# Patient Record
Sex: FEMALE | Race: WHITE | Employment: FULL TIME | URBAN - METROPOLITAN AREA
[De-identification: names, ages, dates, MRNs, and addresses within clinical notes are randomized per-mention and may not be internally consistent; named-entity substitution may affect disease eponyms.]

---

## 2017-01-18 PROBLEM — O34.219 PREVIOUS CESAREAN DELIVERY, ANTEPARTUM CONDITION OR COMPLICATION: Status: ACTIVE | Noted: 2017-01-18

## 2017-02-13 PROBLEM — O24.414 INSULIN CONTROLLED GESTATIONAL DIABETES MELLITUS IN THIRD TRIMESTER: Status: ACTIVE | Noted: 2017-02-13

## 2017-02-24 PROBLEM — O24.419 GESTATIONAL DIABETES MELLITUS (GDM), ANTEPARTUM: Status: ACTIVE | Noted: 2017-02-13

## 2017-03-06 PROBLEM — O99.213 MATERNAL MORBID OBESITY IN THIRD TRIMESTER, ANTEPARTUM (HCC): Status: ACTIVE | Noted: 2017-03-06

## 2017-03-06 PROBLEM — E66.01 MATERNAL MORBID OBESITY IN THIRD TRIMESTER, ANTEPARTUM (HCC): Status: ACTIVE | Noted: 2017-03-06

## 2017-03-06 PROBLEM — O24.419 GESTATIONAL DIABETES MELLITUS (GDM), ANTEPARTUM: Status: RESOLVED | Noted: 2017-02-13 | Resolved: 2017-03-06

## 2017-03-06 PROBLEM — O24.414 INSULIN CONTROLLED GESTATIONAL DIABETES MELLITUS (GDM) DURING PREGNANCY, ANTEPARTUM: Status: ACTIVE | Noted: 2017-03-06

## 2017-03-24 PROBLEM — G43.009 MIGRAINE WITHOUT AURA AND WITHOUT STATUS MIGRAINOSUS, NOT INTRACTABLE: Status: ACTIVE | Noted: 2017-03-24

## 2017-03-24 PROBLEM — O26.893 PREGNANCY HEADACHE IN THIRD TRIMESTER: Status: ACTIVE | Noted: 2017-03-24

## 2017-03-24 PROBLEM — R51.9 PREGNANCY HEADACHE IN THIRD TRIMESTER: Status: ACTIVE | Noted: 2017-03-24

## 2017-03-24 PROBLEM — R42 DIZZINESS: Status: ACTIVE | Noted: 2017-03-24

## 2017-03-28 PROBLEM — R07.89 ATYPICAL CHEST PAIN: Status: ACTIVE | Noted: 2017-03-28

## 2019-04-09 ENCOUNTER — HOSPITAL ENCOUNTER (OUTPATIENT)
Dept: NON INVASIVE DIAGNOSTICS | Age: 34
Discharge: HOME | End: 2019-04-09
Payer: COMMERCIAL

## 2019-04-09 ENCOUNTER — HOSPITAL ENCOUNTER (OUTPATIENT)
Age: 34
Discharge: HOME | End: 2019-04-09
Payer: COMMERCIAL

## 2019-04-09 LAB
ALBUMIN SERPL-MCNC: 4.1 G/DL (ref 3.5–5.2)
ALP BLD-CCNC: 98 U/L (ref 35–104)
ALT SERPL-CCNC: 18 U/L (ref 0–32)
ANION GAP SERPL CALCULATED.3IONS-SCNC: 10 MMOL/L (ref 7–16)
AST SERPL-CCNC: 16 U/L (ref 0–31)
BASOPHILS ABSOLUTE: 0.04 E9/L (ref 0–0.2)
BASOPHILS RELATIVE PERCENT: 0.7 % (ref 0–2)
BILIRUB SERPL-MCNC: 0.4 MG/DL (ref 0–1.2)
BUN BLDV-MCNC: 10 MG/DL (ref 6–20)
CALCIUM SERPL-MCNC: 9.4 MG/DL (ref 8.6–10.2)
CHLORIDE BLD-SCNC: 107 MMOL/L (ref 98–107)
CHOLESTEROL, FASTING: 141 MG/DL (ref 0–199)
CO2: 25 MMOL/L (ref 22–29)
CREAT SERPL-MCNC: 0.8 MG/DL (ref 0.5–1)
EOSINOPHILS ABSOLUTE: 0.27 E9/L (ref 0.05–0.5)
EOSINOPHILS RELATIVE PERCENT: 4.6 % (ref 0–6)
GFR AFRICAN AMERICAN: >60
GFR NON-AFRICAN AMERICAN: >60 ML/MIN/1.73
GLUCOSE BLD-MCNC: 110 MG/DL (ref 74–99)
HCT VFR BLD CALC: 38.2 % (ref 34–48)
HDLC SERPL-MCNC: 62 MG/DL
HEMOGLOBIN: 11.6 G/DL (ref 11.5–15.5)
IMMATURE GRANULOCYTES #: 0.03 E9/L
IMMATURE GRANULOCYTES %: 0.5 % (ref 0–5)
LDL CHOLESTEROL CALCULATED: 64 MG/DL (ref 0–99)
LV EF: 64 %
LVEF MODALITY: NORMAL
LYMPHOCYTES ABSOLUTE: 1.53 E9/L (ref 1.5–4)
LYMPHOCYTES RELATIVE PERCENT: 26.3 % (ref 20–42)
MCH RBC QN AUTO: 23.3 PG (ref 26–35)
MCHC RBC AUTO-ENTMCNC: 30.4 % (ref 32–34.5)
MCV RBC AUTO: 76.9 FL (ref 80–99.9)
MONOCYTES ABSOLUTE: 0.47 E9/L (ref 0.1–0.95)
MONOCYTES RELATIVE PERCENT: 8.1 % (ref 2–12)
NEUTROPHILS ABSOLUTE: 3.48 E9/L (ref 1.8–7.3)
NEUTROPHILS RELATIVE PERCENT: 59.8 % (ref 43–80)
PDW BLD-RTO: 15.4 FL (ref 11.5–15)
PLATELET # BLD: 256 E9/L (ref 130–450)
PMV BLD AUTO: 11.6 FL (ref 7–12)
POTASSIUM SERPL-SCNC: 4.2 MMOL/L (ref 3.5–5)
RBC # BLD: 4.97 E12/L (ref 3.5–5.5)
SODIUM BLD-SCNC: 142 MMOL/L (ref 132–146)
TOTAL PROTEIN: 7.2 G/DL (ref 6.4–8.3)
TRIGLYCERIDE, FASTING: 73 MG/DL (ref 0–149)
TSH SERPL DL<=0.05 MIU/L-ACNC: 2.72 UIU/ML (ref 0.27–4.2)
VITAMIN D 25-HYDROXY: 14 NG/ML (ref 30–100)
VLDLC SERPL CALC-MCNC: 15 MG/DL
WBC # BLD: 5.8 E9/L (ref 4.5–11.5)

## 2019-04-09 PROCEDURE — 36415 COLL VENOUS BLD VENIPUNCTURE: CPT

## 2019-04-09 PROCEDURE — 85025 COMPLETE CBC W/AUTO DIFF WBC: CPT

## 2019-04-09 PROCEDURE — 80061 LIPID PANEL: CPT

## 2019-04-09 PROCEDURE — 84443 ASSAY THYROID STIM HORMONE: CPT

## 2019-04-09 PROCEDURE — 93005 ELECTROCARDIOGRAM TRACING: CPT | Performed by: FAMILY MEDICINE

## 2019-04-09 PROCEDURE — 80053 COMPREHEN METABOLIC PANEL: CPT

## 2019-04-09 PROCEDURE — 93010 ELECTROCARDIOGRAM REPORT: CPT | Performed by: INTERNAL MEDICINE

## 2019-04-09 PROCEDURE — 82306 VITAMIN D 25 HYDROXY: CPT

## 2019-04-09 PROCEDURE — 93306 TTE W/DOPPLER COMPLETE: CPT

## 2019-04-11 LAB
EKG ATRIAL RATE: 73 BPM
EKG P AXIS: 23 DEGREES
EKG P-R INTERVAL: 144 MS
EKG Q-T INTERVAL: 392 MS
EKG QRS DURATION: 88 MS
EKG QTC CALCULATION (BAZETT): 431 MS
EKG R AXIS: 94 DEGREES
EKG T AXIS: 54 DEGREES
EKG VENTRICULAR RATE: 73 BPM

## 2019-09-04 ENCOUNTER — HOSPITAL ENCOUNTER (EMERGENCY)
Age: 34
Discharge: HOME | End: 2019-09-05
Attending: EMERGENCY MEDICINE
Payer: COMMERCIAL

## 2019-09-04 VITALS
HEART RATE: 54 BPM | RESPIRATION RATE: 16 BRPM | SYSTOLIC BLOOD PRESSURE: 154 MMHG | TEMPERATURE: 97.4 F | WEIGHT: 290 LBS | DIASTOLIC BLOOD PRESSURE: 75 MMHG | OXYGEN SATURATION: 100 % | HEIGHT: 65 IN | BODY MASS INDEX: 48.32 KG/M2

## 2019-09-04 DIAGNOSIS — M79.604 RIGHT LEG PAIN: Primary | ICD-10-CM

## 2019-09-04 LAB
ALBUMIN SERPL-MCNC: 4 G/DL (ref 3.5–5.2)
ALP BLD-CCNC: 101 U/L (ref 35–104)
ALT SERPL-CCNC: 11 U/L (ref 0–32)
ANION GAP SERPL CALCULATED.3IONS-SCNC: 10 MMOL/L (ref 7–16)
AST SERPL-CCNC: 15 U/L (ref 0–31)
BASOPHILS ABSOLUTE: 0.04 E9/L (ref 0–0.2)
BASOPHILS RELATIVE PERCENT: 0.5 % (ref 0–2)
BILIRUB SERPL-MCNC: 0.3 MG/DL (ref 0–1.2)
BUN BLDV-MCNC: 13 MG/DL (ref 6–20)
CALCIUM SERPL-MCNC: 9.4 MG/DL (ref 8.6–10.2)
CHLORIDE BLD-SCNC: 102 MMOL/L (ref 98–107)
CO2: 25 MMOL/L (ref 22–29)
CREAT SERPL-MCNC: 0.8 MG/DL (ref 0.5–1)
D DIMER: 255 NG/ML DDU
EOSINOPHILS ABSOLUTE: 0.45 E9/L (ref 0.05–0.5)
EOSINOPHILS RELATIVE PERCENT: 5.2 % (ref 0–6)
GFR AFRICAN AMERICAN: >60
GFR NON-AFRICAN AMERICAN: >60 ML/MIN/1.73
GLUCOSE BLD-MCNC: 135 MG/DL (ref 74–99)
HCT VFR BLD CALC: 37.7 % (ref 34–48)
HEMOGLOBIN: 11.6 G/DL (ref 11.5–15.5)
IMMATURE GRANULOCYTES #: 0.03 E9/L
IMMATURE GRANULOCYTES %: 0.3 % (ref 0–5)
LYMPHOCYTES ABSOLUTE: 1.94 E9/L (ref 1.5–4)
LYMPHOCYTES RELATIVE PERCENT: 22.3 % (ref 20–42)
MCH RBC QN AUTO: 23.2 PG (ref 26–35)
MCHC RBC AUTO-ENTMCNC: 30.8 % (ref 32–34.5)
MCV RBC AUTO: 75.4 FL (ref 80–99.9)
MONOCYTES ABSOLUTE: 0.74 E9/L (ref 0.1–0.95)
MONOCYTES RELATIVE PERCENT: 8.5 % (ref 2–12)
NEUTROPHILS ABSOLUTE: 5.49 E9/L (ref 1.8–7.3)
NEUTROPHILS RELATIVE PERCENT: 63.2 % (ref 43–80)
PDW BLD-RTO: 15.6 FL (ref 11.5–15)
PLATELET # BLD: 241 E9/L (ref 130–450)
PMV BLD AUTO: 11.1 FL (ref 7–12)
POTASSIUM SERPL-SCNC: 3.7 MMOL/L (ref 3.5–5)
RBC # BLD: 5 E12/L (ref 3.5–5.5)
SODIUM BLD-SCNC: 137 MMOL/L (ref 132–146)
TOTAL PROTEIN: 7.4 G/DL (ref 6.4–8.3)
WBC # BLD: 8.7 E9/L (ref 4.5–11.5)

## 2019-09-04 PROCEDURE — 99283 EMERGENCY DEPT VISIT LOW MDM: CPT

## 2019-09-04 PROCEDURE — 85378 FIBRIN DEGRADE SEMIQUANT: CPT

## 2019-09-04 PROCEDURE — 36415 COLL VENOUS BLD VENIPUNCTURE: CPT

## 2019-09-04 PROCEDURE — 80053 COMPREHEN METABOLIC PANEL: CPT

## 2019-09-04 PROCEDURE — 85025 COMPLETE CBC W/AUTO DIFF WBC: CPT

## 2019-09-04 ASSESSMENT — PAIN DESCRIPTION - DESCRIPTORS: DESCRIPTORS: ACHING

## 2019-09-04 ASSESSMENT — PAIN DESCRIPTION - ORIENTATION: ORIENTATION: RIGHT;LOWER

## 2019-09-04 ASSESSMENT — PAIN SCALES - GENERAL: PAINLEVEL_OUTOF10: 7

## 2019-09-04 ASSESSMENT — PAIN DESCRIPTION - PAIN TYPE: TYPE: ACUTE PAIN

## 2019-09-04 ASSESSMENT — PAIN DESCRIPTION - LOCATION: LOCATION: LEG

## 2019-09-05 NOTE — ED PROVIDER NOTES
that she does not drink alcohol or use drugs. Family History: family history includes Diabetes in her brother; Heart Disease in her father and mother; Hypertension in an other family member; Other in her sister; Thyroid Disease in her sister. The patients home medications have been reviewed. Allergies: Lorazepam; Pcn [penicillins]; and Sulfa antibiotics    ---------------------------------------------------PHYSICAL EXAM--------------------------------------    Constitutional/General: Alert and oriented x3, well appearing, non toxic in NAD  Head: Normocephalic and atraumatic  Eyes: PERRL, EOMI, conjunctiva normal, sclera non icteric  Mouth: Oropharynx clear  Neck: Supple  Respiratory: Lungs clear to auscultation bilaterally, no wheezes, rales, or rhonchi. Not in respiratory distress  Cardiovascular:  Regular rate. Regular rhythm. No murmurs, gallops, or rubs. 2+ distal pulses  Chest: soft tissue tenderness to the left upper chest.  GI:  Abdomen Soft, Non tender, Non distended. +BS. No rebound, guarding, or rigidity. No pulsatile masses. Musculoskeletal: Minimal tenderness to the posterior left leg. Moves all extremities x 4. Warm and well perfused, no clubbing, cyanosis, or edema. Integument: Skin warm and dry. No rashes. Neurologic: GCS 15, no focal deficits, symmetric strength 5/5 in the upper and lower extremities bilaterally  Psychiatric: Normal Affect     -------------------------------------------------- RESULTS -------------------------------------------------  I have personally reviewed all laboratory and imaging results for this patient. Results are listed below.      LABS:  Results for orders placed or performed during the hospital encounter of 09/04/19   CBC auto differential   Result Value Ref Range    WBC 8.7 4.5 - 11.5 E9/L    RBC 5.00 3.50 - 5.50 E12/L    Hemoglobin 11.6 11.5 - 15.5 g/dL    Hematocrit 37.7 34.0 - 48.0 %    MCV 75.4 (L) 80.0 - 99.9 fL    MCH 23.2 (L) 26.0 - 35.0 pg    MCHC 30.8 (L) 32.0 - 34.5 %    RDW 15.6 (H) 11.5 - 15.0 fL    Platelets 314 125 - 717 E9/L    MPV 11.1 7.0 - 12.0 fL    Neutrophils % 63.2 43.0 - 80.0 %    Immature Granulocytes % 0.3 0.0 - 5.0 %    Lymphocytes % 22.3 20.0 - 42.0 %    Monocytes % 8.5 2.0 - 12.0 %    Eosinophils % 5.2 0.0 - 6.0 %    Basophils % 0.5 0.0 - 2.0 %    Neutrophils Absolute 5.49 1.80 - 7.30 E9/L    Immature Granulocytes # 0.03 E9/L    Lymphocytes Absolute 1.94 1.50 - 4.00 E9/L    Monocytes Absolute 0.74 0.10 - 0.95 E9/L    Eosinophils Absolute 0.45 0.05 - 0.50 E9/L    Basophils Absolute 0.04 0.00 - 0.20 E9/L   Comprehensive Metabolic Panel   Result Value Ref Range    Sodium 137 132 - 146 mmol/L    Potassium 3.7 3.5 - 5.0 mmol/L    Chloride 102 98 - 107 mmol/L    CO2 25 22 - 29 mmol/L    Anion Gap 10 7 - 16 mmol/L    Glucose 135 (H) 74 - 99 mg/dL    BUN 13 6 - 20 mg/dL    CREATININE 0.8 0.5 - 1.0 mg/dL    GFR Non-African American >60 >=60 mL/min/1.73    GFR African American >60     Calcium 9.4 8.6 - 10.2 mg/dL    Total Protein 7.4 6.4 - 8.3 g/dL    Alb 4.0 3.5 - 5.2 g/dL    Total Bilirubin 0.3 0.0 - 1.2 mg/dL    Alkaline Phosphatase 101 35 - 104 U/L    ALT 11 0 - 32 U/L    AST 15 0 - 31 U/L   D-Dimer, Quantitative   Result Value Ref Range    D-Dimer, Quant 255 ng/mL DDU       RADIOLOGY:  Interpreted by Radiologist.  No orders to display       ------------------------- NURSING NOTES AND VITALS REVIEWED ---------------------------   The nursing notes within the ED encounter and vital signs as below have been reviewed by myself. BP (!) 154/75   Pulse 54   Temp 97.4 °F (36.3 °C) (Oral)   Resp 16   Ht 5' 5\" (1.651 m)   Wt 290 lb (131.5 kg)   LMP 08/04/2019   SpO2 100%   BMI 48.26 kg/m²   Oxygen Saturation Interpretation: Normal    The patients available past medical records and past encounters were reviewed.       ------------------------------ ED COURSE/MEDICAL DECISION MAKING----------------------  Medications - No data to

## 2020-03-15 ENCOUNTER — APPOINTMENT (OUTPATIENT)
Dept: GENERAL RADIOLOGY | Age: 35
End: 2020-03-15
Payer: COMMERCIAL

## 2020-03-15 ENCOUNTER — HOSPITAL ENCOUNTER (EMERGENCY)
Age: 35
Discharge: HOME | End: 2020-03-15
Attending: EMERGENCY MEDICINE
Payer: COMMERCIAL

## 2020-03-15 VITALS
HEART RATE: 102 BPM | DIASTOLIC BLOOD PRESSURE: 74 MMHG | BODY MASS INDEX: 45 KG/M2 | WEIGHT: 280 LBS | RESPIRATION RATE: 18 BRPM | HEIGHT: 66 IN | SYSTOLIC BLOOD PRESSURE: 158 MMHG | TEMPERATURE: 97.6 F | OXYGEN SATURATION: 98 %

## 2020-03-15 LAB
ALBUMIN SERPL-MCNC: 4.1 G/DL (ref 3.5–5.2)
ALP BLD-CCNC: 103 U/L (ref 35–104)
ALT SERPL-CCNC: 11 U/L (ref 0–32)
ANION GAP SERPL CALCULATED.3IONS-SCNC: 13 MMOL/L (ref 7–16)
AST SERPL-CCNC: 15 U/L (ref 0–31)
BILIRUB SERPL-MCNC: 0.5 MG/DL (ref 0–1.2)
BUN BLDV-MCNC: 16 MG/DL (ref 6–20)
CALCIUM SERPL-MCNC: 9.3 MG/DL (ref 8.6–10.2)
CHLORIDE BLD-SCNC: 101 MMOL/L (ref 98–107)
CO2: 23 MMOL/L (ref 22–29)
CREAT SERPL-MCNC: 0.9 MG/DL (ref 0.5–1)
D DIMER: <200 NG/ML DDU
EKG ATRIAL RATE: 120 BPM
EKG P AXIS: 46 DEGREES
EKG P-R INTERVAL: 152 MS
EKG Q-T INTERVAL: 326 MS
EKG QRS DURATION: 88 MS
EKG QTC CALCULATION (BAZETT): 460 MS
EKG R AXIS: 93 DEGREES
EKG T AXIS: 19 DEGREES
EKG VENTRICULAR RATE: 120 BPM
GFR AFRICAN AMERICAN: >60
GFR NON-AFRICAN AMERICAN: >60 ML/MIN/1.73
GLUCOSE BLD-MCNC: 136 MG/DL (ref 74–99)
HCG(URINE) PREGNANCY TEST: NEGATIVE
HCT VFR BLD CALC: 39.9 % (ref 34–48)
HEMOGLOBIN: 12 G/DL (ref 11.5–15.5)
MCH RBC QN AUTO: 22.2 PG (ref 26–35)
MCHC RBC AUTO-ENTMCNC: 30.1 % (ref 32–34.5)
MCV RBC AUTO: 73.8 FL (ref 80–99.9)
PDW BLD-RTO: 15.9 FL (ref 11.5–15)
PLATELET # BLD: 327 E9/L (ref 130–450)
PMV BLD AUTO: 11.7 FL (ref 7–12)
POTASSIUM SERPL-SCNC: 3.6 MMOL/L (ref 3.5–5)
RBC # BLD: 5.41 E12/L (ref 3.5–5.5)
SODIUM BLD-SCNC: 137 MMOL/L (ref 132–146)
TOTAL PROTEIN: 7.6 G/DL (ref 6.4–8.3)
TROPONIN: <0.01 NG/ML (ref 0–0.03)
WBC # BLD: 11.2 E9/L (ref 4.5–11.5)

## 2020-03-15 PROCEDURE — 84484 ASSAY OF TROPONIN QUANT: CPT

## 2020-03-15 PROCEDURE — 93005 ELECTROCARDIOGRAM TRACING: CPT | Performed by: EMERGENCY MEDICINE

## 2020-03-15 PROCEDURE — 99285 EMERGENCY DEPT VISIT HI MDM: CPT

## 2020-03-15 PROCEDURE — 85027 COMPLETE CBC AUTOMATED: CPT

## 2020-03-15 PROCEDURE — 36415 COLL VENOUS BLD VENIPUNCTURE: CPT

## 2020-03-15 PROCEDURE — 81025 URINE PREGNANCY TEST: CPT

## 2020-03-15 PROCEDURE — 85378 FIBRIN DEGRADE SEMIQUANT: CPT

## 2020-03-15 PROCEDURE — 71046 X-RAY EXAM CHEST 2 VIEWS: CPT

## 2020-03-15 PROCEDURE — 93010 ELECTROCARDIOGRAM REPORT: CPT | Performed by: INTERNAL MEDICINE

## 2020-03-15 PROCEDURE — 80053 COMPREHEN METABOLIC PANEL: CPT

## 2020-03-15 RX ORDER — ASPIRIN 81 MG/1
81 TABLET, CHEWABLE ORAL DAILY
COMMUNITY
End: 2020-10-19

## 2020-03-15 RX ORDER — IBUPROFEN 400 MG/1
400 TABLET ORAL EVERY 6 HOURS PRN
COMMUNITY

## 2020-03-15 ASSESSMENT — PAIN DESCRIPTION - DESCRIPTORS: DESCRIPTORS: DISCOMFORT

## 2020-03-15 ASSESSMENT — PAIN - FUNCTIONAL ASSESSMENT: PAIN_FUNCTIONAL_ASSESSMENT: PREVENTS OR INTERFERES SOME ACTIVE ACTIVITIES AND ADLS

## 2020-03-15 ASSESSMENT — PAIN DESCRIPTION - FREQUENCY: FREQUENCY: CONTINUOUS

## 2020-03-15 ASSESSMENT — PAIN DESCRIPTION - LOCATION: LOCATION: CHEST;JAW

## 2020-03-15 ASSESSMENT — PAIN DESCRIPTION - ORIENTATION: ORIENTATION: LEFT

## 2020-03-15 ASSESSMENT — PAIN DESCRIPTION - PAIN TYPE: TYPE: ACUTE PAIN

## 2020-03-15 ASSESSMENT — PAIN DESCRIPTION - ONSET: ONSET: ON-GOING

## 2020-03-15 ASSESSMENT — PAIN SCALES - GENERAL: PAINLEVEL_OUTOF10: 4

## 2020-03-15 NOTE — ED PROVIDER NOTES
-------------------------------------------------  All laboratory and radiology results have been personally reviewed by myself   LABS:  Results for orders placed or performed during the hospital encounter of 03/15/20   D-Dimer, Quantitative   Result Value Ref Range    D-Dimer, Quant <200 ng/mL DDU   CBC   Result Value Ref Range    WBC 11.2 4.5 - 11.5 E9/L    RBC 5.41 3.50 - 5.50 E12/L    Hemoglobin 12.0 11.5 - 15.5 g/dL    Hematocrit 39.9 34.0 - 48.0 %    MCV 73.8 (L) 80.0 - 99.9 fL    MCH 22.2 (L) 26.0 - 35.0 pg    MCHC 30.1 (L) 32.0 - 34.5 %    RDW 15.9 (H) 11.5 - 15.0 fL    Platelets 322 238 - 623 E9/L    MPV 11.7 7.0 - 12.0 fL   Comprehensive Metabolic Panel   Result Value Ref Range    Sodium 137 132 - 146 mmol/L    Potassium 3.6 3.5 - 5.0 mmol/L    Chloride 101 98 - 107 mmol/L    CO2 23 22 - 29 mmol/L    Anion Gap 13 7 - 16 mmol/L    Glucose 136 (H) 74 - 99 mg/dL    BUN 16 6 - 20 mg/dL    CREATININE 0.9 0.5 - 1.0 mg/dL    GFR Non-African American >60 >=60 mL/min/1.73    GFR African American >60     Calcium 9.3 8.6 - 10.2 mg/dL    Total Protein 7.6 6.4 - 8.3 g/dL    Alb 4.1 3.5 - 5.2 g/dL    Total Bilirubin 0.5 0.0 - 1.2 mg/dL    Alkaline Phosphatase 103 35 - 104 U/L    ALT 11 0 - 32 U/L    AST 15 0 - 31 U/L   Troponin   Result Value Ref Range    Troponin <0.01 0.00 - 0.03 ng/mL   Pregnancy, Urine   Result Value Ref Range    HCG(Urine) Pregnancy Test NEGATIVE NEGATIVE   EKG 12 Lead   Result Value Ref Range    Ventricular Rate 120 BPM    Atrial Rate 120 BPM    P-R Interval 152 ms    QRS Duration 88 ms    Q-T Interval 326 ms    QTc Calculation (Bazett) 460 ms    P Axis 46 degrees    R Axis 93 degrees    T Axis 19 degrees       RADIOLOGY:  Interpreted by Radiologist.  XR CHEST STANDARD (2 VW)   Final Result   No acute cardiopulmonary process.           ------------------------- NURSING NOTES AND VITALS REVIEWED ---------------------------   The nursing notes within the ED encounter and vital signs as below have been reviewed. BP (!) 178/78   Pulse 108   Temp 97.6 °F (36.4 °C) (Oral)   Resp 18   Ht 5' 6\" (1.676 m)   Wt 280 lb (127 kg)   LMP 03/01/2020   SpO2 98%   BMI 45.19 kg/m²   Oxygen Saturation Interpretation: Normal      ---------------------------------------------------PHYSICAL EXAM--------------------------------------      Constitutional/General: Alert and oriented x3, well appearing, non toxic in NAD  Head: NC/AT  Eyes: PERRL, EOMI  Mouth: Oropharynx clear, handling secretions, no trismus  Neck: Supple, full ROM, no meningeal signs. There is no significant parotid swelling or lymphadenopathy however there is some parotid tenderness  Pulmonary: Lungs clear to auscultation bilaterally, no wheezes, rales, or rhonchi. Not in respiratory distress  Cardiovascular:  Regular rate and rhythm, no murmurs, gallops, or rubs. 2+ distal pulses. There is resting tachycardia noted  Abdomen: Soft, non tender, non distended,   Extremities: Moves all extremities x 4. Warm and well perfused  Skin: warm and dry without rash  Neurologic: GCS 15,  Psych: The patient has an anxious affect      ------------------------------ ED COURSE/MEDICAL DECISION MAKING----------------------  Medications - No data to display      Medical Decision Making:      EKG: This EKG is signed and interpreted by me. Rate: 120  Rhythm: Sinus  Interpretation: sinus tachycardia  Comparison: no previous EKG available    Time: 3:40p  Re-evaluation. Patients symptoms are improving  Repeat physical examination is improved  The patient's oxygen saturation is 100% her heart rate is close to 100 but her blood pressure still somewhat elevated. The patient was advised to stop taking the prednisone and the Levaquin and follow-up with her PCP    Counseling:    The emergency provider has spoken with the patient and discussed todays results, in addition to providing specific details for the plan of care and counseling regarding the diagnosis and

## 2020-04-01 ENCOUNTER — HOSPITAL ENCOUNTER (OUTPATIENT)
Age: 35
Discharge: HOME | End: 2020-04-03
Payer: COMMERCIAL

## 2020-04-01 ENCOUNTER — OFFICE VISIT (OUTPATIENT)
Dept: PRIMARY CARE CLINIC | Age: 35
End: 2020-04-01
Payer: COMMERCIAL

## 2020-04-01 VITALS
HEART RATE: 70 BPM | HEIGHT: 66 IN | WEIGHT: 280 LBS | SYSTOLIC BLOOD PRESSURE: 128 MMHG | DIASTOLIC BLOOD PRESSURE: 70 MMHG | TEMPERATURE: 98.8 F | BODY MASS INDEX: 45 KG/M2 | OXYGEN SATURATION: 99 %

## 2020-04-01 PROCEDURE — U0002 COVID-19 LAB TEST NON-CDC: HCPCS

## 2020-04-01 PROCEDURE — 99213 OFFICE O/P EST LOW 20 MIN: CPT | Performed by: NURSE PRACTITIONER

## 2020-04-01 RX ORDER — ALBUTEROL SULFATE 90 UG/1
2 AEROSOL, METERED RESPIRATORY (INHALATION) 4 TIMES DAILY PRN
Qty: 1 INHALER | Refills: 0 | Status: SHIPPED | OUTPATIENT
Start: 2020-04-01

## 2020-04-01 RX ORDER — BENZONATATE 100 MG/1
100 CAPSULE ORAL 2 TIMES DAILY PRN
Qty: 20 CAPSULE | Refills: 0 | Status: SHIPPED | OUTPATIENT
Start: 2020-04-01 | End: 2020-04-08

## 2020-04-01 NOTE — LETTER
[address]  [place] [address]  Phone: [phone]  Fax: Τρικάλων Carrillo, APRN - CNP        April 1, 2020     Patient: Rhonda Dang   YOB: 1985   Date of Visit: 4/1/2020       To Whom it May Concern:    Rhonda Dang was seen in my clinic on 4/1/2020. She may return to work on 4/15/20. If you have any questions or concerns, please don't hesitate to call.     Sincerely,         Mayda Boucher, BRIGIDO - CNP

## 2020-04-01 NOTE — PROGRESS NOTES
Debbi Aceves  1985    No chief complaint on file. Respiratory Symptoms:  Patient complains of 5 day(s) history of sore throat, shortness of breath and productive cough: Sputum is clear . Symptoms have been improving with time. She c/o diarrhea. Loss of taste/smell x 7 days     Relevant PMH: No pertinent PMH and allergic rhinitis. Smoking history:  She  reports that she has never smoked. She has never used smokeless tobacco.     She has  known ill contacts. Patient works at [employer] as a Nurse. Treatment to date: OTC cough suppressant, Acetaminophen. Travel screen completed:  Yes          Vitals:    04/01/20 1033   BP: 128/70   Pulse: 70   Temp: 98.8 °F (37.1 °C)   TempSrc: Oral   SpO2: 99%   Weight: 280 lb (127 kg)   Height: 5' 6\" (1.676 m)      Physical Exam  Vitals signs and nursing note reviewed. Constitutional:       General: She is not in acute distress. Appearance: She is ill-appearing. She is not toxic-appearing or diaphoretic. HENT:      Head: Normocephalic and atraumatic. Right Ear: Tympanic membrane, ear canal and external ear normal.      Left Ear: Tympanic membrane, ear canal and external ear normal.      Nose: Congestion and rhinorrhea (clear) present. Mouth/Throat:      Mouth: Mucous membranes are moist.      Pharynx: Oropharynx is clear. No posterior oropharyngeal erythema. Eyes:      General:         Right eye: No discharge. Left eye: No discharge. Conjunctiva/sclera: Conjunctivae normal.      Pupils: Pupils are equal, round, and reactive to light. Neck:      Musculoskeletal: Normal range of motion and neck supple. No neck rigidity or muscular tenderness. Cardiovascular:      Rate and Rhythm: Normal rate and regular rhythm. Pulses: Normal pulses. Heart sounds: Normal heart sounds. Pulmonary:      Effort: Pulmonary effort is normal. No respiratory distress. Breath sounds: No stridor.  No wheezing, rhonchi or or vehicle) or pets and before you enter a healthcare provider's office. ; If you are caring for others: If the person who is sick is not able to wear a facemask (for example, because it causes trouble breathing), then people who live with the person who is sick should not stay in the same room with them, or they should wear a facemask if they enter a room with the person who is sick. Cover your coughs and sneezes   ; Cover: Cover your mouth and nose with a tissue when you cough or sneeze.   ; Dispose: Throw used tissues in a lined trash can.   ; Wash hands: Immediately wash your hands with soap and water for at least 20 seconds or, if soap and water are not available, clean your hands with an alcohol-based hand  that contains at least 60% alcohol. Clean your hands often   ; Wash hands: Wash your hands often with soap and water for at least 20 seconds, especially after blowing your nose, coughing, or sneezing; going to the bathroom; and before eating or preparing food.   ; Hand : If soap and water are not readily available, use an alcohol-based hand  with at least 60% alcohol, covering all surfaces of your hands and rubbing them together until they feel dry.   ; Soap and water: Soap and water are the best option if hands are visibly dirty.   ; Avoid touching: Avoid touching your eyes, nose, and mouth with unwashed hands. Handwashing Tips   ; Wet your hands with clean, running water (warm or cold), turn off the tap, and apply soap.  ; Lather your hands by rubbing them together with the soap. Lather the backs of your hands, between your fingers, and under your nails. ; Scrub your hands for at least 20 seconds. Need a timer? Hum the [place] from beginning to end twice.  ; Rinse your hands well under clean, running water.  ; Dry your hands using a clean towel or air dry them. Avoid sharing personal household items   ; Do not share:  You should not share dishes, drinking

## 2020-04-04 LAB
REPORT: ABNORMAL
SARS-COV-2: DETECTED
THIS TEST SENT TO: ABNORMAL

## 2020-04-06 ENCOUNTER — TELEPHONE (OUTPATIENT)
Dept: PRIMARY CARE CLINIC | Age: 35
End: 2020-04-06

## 2020-09-11 ENCOUNTER — HOSPITAL ENCOUNTER (OUTPATIENT)
Age: 35
Discharge: HOME | End: 2020-09-13
Payer: COMMERCIAL

## 2020-09-11 PROCEDURE — U0003 INFECTIOUS AGENT DETECTION BY NUCLEIC ACID (DNA OR RNA); SEVERE ACUTE RESPIRATORY SYNDROME CORONAVIRUS 2 (SARS-COV-2) (CORONAVIRUS DISEASE [COVID-19]), AMPLIFIED PROBE TECHNIQUE, MAKING USE OF HIGH THROUGHPUT TECHNOLOGIES AS DESCRIBED BY CMS-2020-01-R: HCPCS

## 2020-09-13 LAB
SARS-COV-2: NOT DETECTED
SOURCE: NORMAL

## 2020-10-09 ENCOUNTER — HOSPITAL ENCOUNTER (OUTPATIENT)
Age: 35
Discharge: HOME | End: 2020-10-11
Payer: COMMERCIAL

## 2020-10-09 PROCEDURE — U0003 INFECTIOUS AGENT DETECTION BY NUCLEIC ACID (DNA OR RNA); SEVERE ACUTE RESPIRATORY SYNDROME CORONAVIRUS 2 (SARS-COV-2) (CORONAVIRUS DISEASE [COVID-19]), AMPLIFIED PROBE TECHNIQUE, MAKING USE OF HIGH THROUGHPUT TECHNOLOGIES AS DESCRIBED BY CMS-2020-01-R: HCPCS

## 2020-10-10 LAB
SARS-COV-2: NOT DETECTED
SOURCE: NORMAL

## 2020-10-16 ENCOUNTER — HOSPITAL ENCOUNTER (OUTPATIENT)
Age: 35
Discharge: HOME | End: 2020-10-18
Payer: COMMERCIAL

## 2020-10-16 PROCEDURE — U0003 INFECTIOUS AGENT DETECTION BY NUCLEIC ACID (DNA OR RNA); SEVERE ACUTE RESPIRATORY SYNDROME CORONAVIRUS 2 (SARS-COV-2) (CORONAVIRUS DISEASE [COVID-19]), AMPLIFIED PROBE TECHNIQUE, MAKING USE OF HIGH THROUGHPUT TECHNOLOGIES AS DESCRIBED BY CMS-2020-01-R: HCPCS

## 2020-10-17 LAB
SARS-COV-2: NOT DETECTED
SOURCE: NORMAL

## 2020-10-19 ENCOUNTER — OFFICE VISIT (OUTPATIENT)
Dept: ENT CLINIC | Age: 35
End: 2020-10-19
Payer: COMMERCIAL

## 2020-10-19 VITALS — HEIGHT: 65 IN | BODY MASS INDEX: 48.82 KG/M2 | WEIGHT: 293 LBS | TEMPERATURE: 98 F

## 2020-10-19 PROCEDURE — 99204 OFFICE O/P NEW MOD 45 MIN: CPT | Performed by: OTOLARYNGOLOGY

## 2020-10-19 ASSESSMENT — ENCOUNTER SYMPTOMS
EYE DISCHARGE: 0
COLOR CHANGE: 0
SORE THROAT: 1
SINUS PAIN: 0
VOMITING: 0
SHORTNESS OF BREATH: 0
EYE PAIN: 0
NAUSEA: 0
FACIAL SWELLING: 0
COUGH: 0

## 2020-10-19 NOTE — PROGRESS NOTES
[facility] Otolaryngology  Dr. Regla Baptiste. RAPHAEL Polanco Ms.Ed. New Consult       Patient Name:  Chao Savage  :  1985     CHIEF C/O:    Chief Complaint   Patient presents with    New Patient     patient c/o throat pain started about 6 weeks ago    Pharyngitis       HISTORY OBTAINED FROM:  patient    HISTORY OF PRESENT ILLNESS:       Rachel Waite is a 38y.o. year old female, here today for evaluation of throat pain and swelling over left side of neck and pressure in her left ear. Was evaluated by PCP and put on levaquin on 20. She denies recent f/c/n/v. She is a non-smoker. States that she has known polyps on her left side. Patient notices that if she doesn't take her zyrtec the pain seems to flare up. She is also on flonase inconsistently. Past Medical History:   Diagnosis Date    Benign essential hypertension antepartum     GDM (gestational diabetes mellitus) 2012    Hx gestational diabetes     Obesity      Past Surgical History:   Procedure Laterality Date     SECTION  2017    KNEE SURGERY      LAPAROSCOPY  2010    TONSILLECTOMY      WISDOM TOOTH EXTRACTION         Current Outpatient Medications:     albuterol sulfate HFA (VENTOLIN HFA) 108 (90 Base) MCG/ACT inhaler, Inhale 2 puffs into the lungs 4 times daily as needed for Wheezing, Disp: 1 Inhaler, Rfl: 0    ibuprofen (ADVIL;MOTRIN) 400 MG tablet, Take 400 mg by mouth every 6 hours as needed for Pain, Disp: , Rfl:   Lorazepam; Pcn [penicillins]; and Sulfa antibiotics  Social History     Tobacco Use    Smoking status: Never Smoker    Smokeless tobacco: Never Used   Substance Use Topics    Alcohol use: No    Drug use: No     Family History   Problem Relation Age of Onset    Heart Disease Father     Heart Disease Mother     Other Sister         RESPIRATORY DISEASE    Thyroid Disease Sister     Hypertension Other     Diabetes Brother        Review of Systems   Constitutional: Negative for chills and fever. HENT: Positive for sore throat. Negative for ear discharge, facial swelling, hearing loss, mouth sores and sinus pain. Eyes: Negative for pain and discharge. Respiratory: Negative for cough and shortness of breath. Cardiovascular: Negative for chest pain and palpitations. Gastrointestinal: Negative for nausea and vomiting. Musculoskeletal: Negative for neck pain. Skin: Negative for color change, pallor and rash. Allergic/Immunologic: Negative for environmental allergies. Neurological: Negative for dizziness and headaches. Hematological: Does not bruise/bleed easily. Psychiatric/Behavioral: Negative for agitation and behavioral problems. All other systems reviewed and are negative. Temp 98 °F (36.7 °C)   Ht 5' 5\" (1.651 m)   Wt 300 lb (136.1 kg)   BMI 49.92 kg/m²   Physical Exam  Vitals signs and nursing note reviewed. Constitutional:       General: She is not in acute distress. Appearance: Normal appearance. She is well-developed. HENT:      Head: Normocephalic and atraumatic. Right Ear: Tympanic membrane and ear canal normal.      Left Ear: Tympanic membrane and ear canal normal.      Nose: Nose normal.      Comments: Polyp noted in left nose     Mouth/Throat:      Mouth: Mucous membranes are moist.      Pharynx: Oropharynx is clear. No oropharyngeal exudate. Eyes:      Extraocular Movements: Extraocular movements intact. Pupils: Pupils are equal, round, and reactive to light. Neck:      Musculoskeletal: Normal range of motion and neck supple. Muscular tenderness present. Thyroid: No thyromegaly. Trachea: No tracheal deviation. Comments: Tenderness elicited over left SCM  Cardiovascular:      Rate and Rhythm: Normal rate. Pulmonary:      Effort: Pulmonary effort is normal. No respiratory distress. Musculoskeletal: Normal range of motion. General: No tenderness. Skin:     General: Skin is warm and dry.    Neurological:      General: No focal deficit present. Mental Status: She is alert and oriented to person, place, and time. Cranial Nerves: No cranial nerve deficit. Psychiatric:         Mood and Affect: Mood normal.         Behavior: Behavior normal.         IMPRESSION/PLAN:  Patient has received adequate antibiotic treatment. Given her persistent symptoms and findings on physical exam, I recommend CT scan neck with contrast to further evaluate. Steroids were offered, patient declined. Continue using flonase 2 sprays in each nostril once daily up to twice daily for polyps. Continue with zyrtec as needed. Follow up with me in clinic with imaging results. Dr. Cj Gary.  Otolaryngology Facial Plastic Surgery  :[facility] Otolaryngology/Facial Plastic Surgery Residency  Associate Clinical Professor:  Levar Goldstein [address] [place] [address]  1985      I have discussed the case, including pertinent history and exam findings with the resident. I have seen and examined the patient and the key elements of the encounter have been performed by me. I agree with the assessment, plan and orders as documented by the resident. Patient here for follow up of medical problems. Remainder of medical problems as per resident note.       Edi Etienne,   10/29/20

## 2020-10-21 ENCOUNTER — HOSPITAL ENCOUNTER (OUTPATIENT)
Age: 35
Discharge: HOME | End: 2020-10-21
Payer: COMMERCIAL

## 2020-10-21 LAB
ALBUMIN SERPL-MCNC: 4.1 G/DL (ref 3.5–5.2)
ALP BLD-CCNC: 84 U/L (ref 35–104)
ALT SERPL-CCNC: 20 U/L (ref 0–32)
ANION GAP SERPL CALCULATED.3IONS-SCNC: 11 MMOL/L (ref 7–16)
AST SERPL-CCNC: 20 U/L (ref 0–31)
BASOPHILS ABSOLUTE: 0.04 E9/L (ref 0–0.2)
BASOPHILS RELATIVE PERCENT: 0.6 % (ref 0–2)
BILIRUB SERPL-MCNC: 0.4 MG/DL (ref 0–1.2)
BUN BLDV-MCNC: 7 MG/DL (ref 6–20)
CALCIUM SERPL-MCNC: 9.7 MG/DL (ref 8.6–10.2)
CHLORIDE BLD-SCNC: 104 MMOL/L (ref 98–107)
CHOLESTEROL, TOTAL: 146 MG/DL (ref 0–199)
CO2: 25 MMOL/L (ref 22–29)
CREAT SERPL-MCNC: 0.7 MG/DL (ref 0.5–1)
EOSINOPHILS ABSOLUTE: 0.4 E9/L (ref 0.05–0.5)
EOSINOPHILS RELATIVE PERCENT: 6.4 % (ref 0–6)
GFR AFRICAN AMERICAN: >60
GFR NON-AFRICAN AMERICAN: >60 ML/MIN/1.73
GLUCOSE BLD-MCNC: 129 MG/DL (ref 74–99)
HBA1C MFR BLD: 5.6 % (ref 4–5.6)
HCT VFR BLD CALC: 38 % (ref 34–48)
HDLC SERPL-MCNC: 68 MG/DL
HEMOGLOBIN: 11.6 G/DL (ref 11.5–15.5)
IMMATURE GRANULOCYTES #: 0.03 E9/L
IMMATURE GRANULOCYTES %: 0.5 % (ref 0–5)
LDL CHOLESTEROL CALCULATED: 61 MG/DL (ref 0–99)
LYMPHOCYTES ABSOLUTE: 1.44 E9/L (ref 1.5–4)
LYMPHOCYTES RELATIVE PERCENT: 23.2 % (ref 20–42)
MCH RBC QN AUTO: 22.9 PG (ref 26–35)
MCHC RBC AUTO-ENTMCNC: 30.5 % (ref 32–34.5)
MCV RBC AUTO: 75.1 FL (ref 80–99.9)
MONOCYTES ABSOLUTE: 0.43 E9/L (ref 0.1–0.95)
MONOCYTES RELATIVE PERCENT: 6.9 % (ref 2–12)
NEUTROPHILS ABSOLUTE: 3.88 E9/L (ref 1.8–7.3)
NEUTROPHILS RELATIVE PERCENT: 62.4 % (ref 43–80)
PDW BLD-RTO: 16.1 FL (ref 11.5–15)
PLATELET # BLD: 269 E9/L (ref 130–450)
PMV BLD AUTO: 11.3 FL (ref 7–12)
POTASSIUM SERPL-SCNC: 4.2 MMOL/L (ref 3.5–5)
RBC # BLD: 5.06 E12/L (ref 3.5–5.5)
SODIUM BLD-SCNC: 140 MMOL/L (ref 132–146)
TOTAL PROTEIN: 7.6 G/DL (ref 6.4–8.3)
TRIGL SERPL-MCNC: 83 MG/DL (ref 0–149)
TSH SERPL DL<=0.05 MIU/L-ACNC: 2.89 UIU/ML (ref 0.27–4.2)
VITAMIN D 25-HYDROXY: 22 NG/ML (ref 30–100)
VLDLC SERPL CALC-MCNC: 17 MG/DL
WBC # BLD: 6.2 E9/L (ref 4.5–11.5)

## 2020-10-21 PROCEDURE — 80061 LIPID PANEL: CPT

## 2020-10-21 PROCEDURE — 85025 COMPLETE CBC W/AUTO DIFF WBC: CPT

## 2020-10-21 PROCEDURE — 84443 ASSAY THYROID STIM HORMONE: CPT

## 2020-10-21 PROCEDURE — 82306 VITAMIN D 25 HYDROXY: CPT

## 2020-10-21 PROCEDURE — 80053 COMPREHEN METABOLIC PANEL: CPT

## 2020-10-21 PROCEDURE — 83036 HEMOGLOBIN GLYCOSYLATED A1C: CPT

## 2020-10-21 PROCEDURE — 36415 COLL VENOUS BLD VENIPUNCTURE: CPT

## 2020-10-23 ENCOUNTER — HOSPITAL ENCOUNTER (OUTPATIENT)
Age: 35
Discharge: HOME | End: 2020-10-25
Payer: COMMERCIAL

## 2020-10-23 PROCEDURE — U0003 INFECTIOUS AGENT DETECTION BY NUCLEIC ACID (DNA OR RNA); SEVERE ACUTE RESPIRATORY SYNDROME CORONAVIRUS 2 (SARS-COV-2) (CORONAVIRUS DISEASE [COVID-19]), AMPLIFIED PROBE TECHNIQUE, MAKING USE OF HIGH THROUGHPUT TECHNOLOGIES AS DESCRIBED BY CMS-2020-01-R: HCPCS

## 2020-10-25 LAB
SARS-COV-2: NOT DETECTED
SOURCE: NORMAL

## 2020-10-30 ENCOUNTER — HOSPITAL ENCOUNTER (OUTPATIENT)
Age: 35
Discharge: HOME | End: 2020-11-01
Payer: COMMERCIAL

## 2020-10-30 PROCEDURE — U0003 INFECTIOUS AGENT DETECTION BY NUCLEIC ACID (DNA OR RNA); SEVERE ACUTE RESPIRATORY SYNDROME CORONAVIRUS 2 (SARS-COV-2) (CORONAVIRUS DISEASE [COVID-19]), AMPLIFIED PROBE TECHNIQUE, MAKING USE OF HIGH THROUGHPUT TECHNOLOGIES AS DESCRIBED BY CMS-2020-01-R: HCPCS

## 2020-10-31 LAB
SARS-COV-2: NOT DETECTED
SOURCE: NORMAL

## 2020-11-06 LAB
SARS-COV-2: NOT DETECTED
SOURCE: NORMAL

## 2020-11-09 ENCOUNTER — HOSPITAL ENCOUNTER (OUTPATIENT)
Dept: CT IMAGING | Age: 35
Discharge: HOME | End: 2020-11-11
Payer: COMMERCIAL

## 2020-11-09 PROCEDURE — 70491 CT SOFT TISSUE NECK W/DYE: CPT

## 2020-11-09 PROCEDURE — 6360000004 HC RX CONTRAST MEDICATION: Performed by: RADIOLOGY

## 2020-11-09 RX ORDER — SODIUM CHLORIDE 0.9 % (FLUSH) 0.9 %
10 SYRINGE (ML) INJECTION PRN
Status: DISCONTINUED | OUTPATIENT
Start: 2020-11-09 | End: 2020-11-12 | Stop reason: HOSPADM

## 2020-11-09 RX ADMIN — IOPAMIDOL 75 ML: 755 INJECTION, SOLUTION INTRAVENOUS at 17:22

## 2020-11-13 LAB
SARS-COV-2: NOT DETECTED
SOURCE: NORMAL

## 2020-11-17 ENCOUNTER — TELEPHONE (OUTPATIENT)
Dept: ADMINISTRATIVE | Age: 35
End: 2020-11-17

## 2020-11-17 NOTE — TELEPHONE ENCOUNTER
Pt called to verify susy perera/Sherri for CT results, not knowing she missed it today. She had a  set up for tomorrow.   Please call her if she can come in tomorrow at [phone]

## 2020-11-17 NOTE — TELEPHONE ENCOUNTER
Per [facility] appt for 11/17/20 was confirmed via text. No openings for tomorrow; patient can be scheduled first available.

## 2020-11-20 LAB
SARS-COV-2: NOT DETECTED
SOURCE: NORMAL

## 2020-11-27 LAB
SARS-COV-2: NOT DETECTED
SOURCE: NORMAL

## 2020-12-04 LAB
SARS-COV-2: NOT DETECTED
SOURCE: NORMAL

## 2020-12-08 ENCOUNTER — OFFICE VISIT (OUTPATIENT)
Dept: ENT CLINIC | Age: 35
End: 2020-12-08
Payer: COMMERCIAL

## 2020-12-08 VITALS — BODY MASS INDEX: 48.82 KG/M2 | TEMPERATURE: 97.5 F | WEIGHT: 293 LBS | HEIGHT: 65 IN

## 2020-12-08 PROCEDURE — 99213 OFFICE O/P EST LOW 20 MIN: CPT | Performed by: OTOLARYNGOLOGY

## 2020-12-09 LAB
SARS-COV-2: NOT DETECTED
SOURCE: NORMAL

## 2020-12-11 LAB
SARS-COV-2: NOT DETECTED
SOURCE: NORMAL

## 2020-12-15 LAB
SARS-COV-2: NOT DETECTED
SOURCE: NORMAL

## 2020-12-19 LAB
SARS-COV-2: NOT DETECTED
SOURCE: NORMAL

## 2020-12-22 ASSESSMENT — ENCOUNTER SYMPTOMS
SHORTNESS OF BREATH: 0
VOMITING: 0
COUGH: 0
SINUS PAIN: 0

## 2020-12-22 NOTE — PROGRESS NOTES
[facility] Otolaryngology  Dr. Jose R Durant. Joshua Diaz. Ms.Ed        Patient Name:  Adrienne Castelan  :  1985     CHIEF C/O:    Chief Complaint   Patient presents with    Results     f/u for CT results       HISTORY OBTAINED FROM:  patient    HISTORY OF PRESENT ILLNESS:       Bandar Good is a 38y.o. year old female, here today for follow up of history of neck fullness and concern for possible underlying neck mass. Patient on a CT scan to rule out any underlying neck masses of the deep neck spaces including below the level of the SCM. CT scan of the neck is reviewed with the patient date, and no further complaints of difficulty swallowing sore throat fever chills hoarseness nausea vomit neck mass or chest pain. Past Medical History:   Diagnosis Date    Benign essential hypertension antepartum     GDM (gestational diabetes mellitus) 2012    Hx gestational diabetes     Obesity      Past Surgical History:   Procedure Laterality Date     SECTION  2017    KNEE SURGERY      LAPAROSCOPY  2010    TONSILLECTOMY      WISDOM TOOTH EXTRACTION         Current Outpatient Medications:     albuterol sulfate HFA (VENTOLIN HFA) 108 (90 Base) MCG/ACT inhaler, Inhale 2 puffs into the lungs 4 times daily as needed for Wheezing, Disp: 1 Inhaler, Rfl: 0    ibuprofen (ADVIL;MOTRIN) 400 MG tablet, Take 400 mg by mouth every 6 hours as needed for Pain, Disp: , Rfl:   Lorazepam, Pcn [penicillins], and Sulfa antibiotics  Social History     Tobacco Use    Smoking status: Never Smoker    Smokeless tobacco: Never Used   Substance Use Topics    Alcohol use: No    Drug use: No     Family History   Problem Relation Age of Onset    Heart Disease Father     Heart Disease Mother     Other Sister         RESPIRATORY DISEASE    Thyroid Disease Sister     Hypertension Other     Diabetes Brother        Review of Systems   Constitutional: Negative for chills and fever. HENT: Negative for congestion, ear discharge, hearing loss, postnasal drip and sinus pain. Respiratory: Negative for cough and shortness of breath. Cardiovascular: Negative for chest pain and palpitations. Gastrointestinal: Negative for vomiting. Skin: Negative for rash. Allergic/Immunologic: Negative for environmental allergies. Neurological: Negative for dizziness and headaches. Hematological: Does not bruise/bleed easily. All other systems reviewed and are negative. Temp 97.5 °F (36.4 °C)   Ht 5' 5\" (1.651 m)   Wt 300 lb (136.1 kg)   BMI 49.92 kg/m²   Physical Exam  Vitals signs and nursing note reviewed. Constitutional:       Appearance: She is well-developed. HENT:      Head: Normocephalic and atraumatic. Right Ear: Tympanic membrane and ear canal normal.      Left Ear: Tympanic membrane and ear canal normal.      Mouth/Throat:      Mouth: Mucous membranes are dry. Pharynx: No oropharyngeal exudate or posterior oropharyngeal erythema. Eyes:      Pupils: Pupils are equal, round, and reactive to light. Neck:      Musculoskeletal: Normal range of motion. Thyroid: No thyromegaly. Trachea: No tracheal deviation. Cardiovascular:      Rate and Rhythm: Normal rate. Pulmonary:      Effort: Pulmonary effort is normal. No respiratory distress. Musculoskeletal: Normal range of motion. Lymphadenopathy:      Cervical: No cervical adenopathy. Skin:     General: Skin is warm. Findings: No erythema. Neurological:      Mental Status: She is alert. Cranial Nerves: No cranial nerve deficit. No results found. IMPRESSION/PLAN:  Patient seen and examined a history of neck fullness that was found to be without any specific lesions over the deep neck spaces, recommendation of patient follow-up primary care physician, no sign of mass no biopsy indicated, follow-up with any other concerns or complaints in the future. Dr. Moustapha Dhaliwal.  Otolaryngology Facial Plastic Surgery  :  [facility] Otolaryngology/Facial Plastic Surgery Residency  Associate Clinical Professor:  Phyllis Villarreal [facility]

## 2020-12-23 LAB
SARS-COV-2: NOT DETECTED
SOURCE: NORMAL

## 2020-12-27 LAB
SARS-COV-2: NOT DETECTED
SOURCE: NORMAL

## 2020-12-31 LAB
SARS-COV-2: NOT DETECTED
SOURCE: NORMAL

## 2021-01-03 LAB
SARS-COV-2: NOT DETECTED
SOURCE: NORMAL

## 2021-01-06 LAB
SARS-COV-2: NOT DETECTED
SOURCE: NORMAL

## 2021-01-08 LAB
SARS-COV-2: NOT DETECTED
SOURCE: NORMAL

## 2021-01-13 LAB
SARS-COV-2: NOT DETECTED
SOURCE: NORMAL

## 2021-01-16 LAB
SARS-COV-2: NOT DETECTED
SOURCE: NORMAL

## 2021-01-20 LAB
SARS-COV-2: NOT DETECTED
SOURCE: NORMAL

## 2021-01-22 LAB
SARS-COV-2: NOT DETECTED
SOURCE: NORMAL

## 2021-01-27 LAB
SARS-COV-2: NOT DETECTED
SOURCE: NORMAL

## 2021-01-30 LAB
SARS-COV-2: NOT DETECTED
SOURCE: NORMAL

## 2021-02-03 LAB
SARS-COV-2: NOT DETECTED
SOURCE: NORMAL

## 2021-02-06 LAB
SARS-COV-2: NOT DETECTED
SOURCE: NORMAL

## 2021-02-10 LAB
SARS-COV-2: NOT DETECTED
SOURCE: NORMAL

## 2021-02-13 LAB
SARS-COV-2: NOT DETECTED
SOURCE: NORMAL

## 2021-02-17 LAB
SARS-COV-2: NOT DETECTED
SOURCE: NORMAL

## 2021-02-20 LAB
SARS-COV-2: NOT DETECTED
SOURCE: NORMAL

## 2021-02-24 LAB
SARS-COV-2: NOT DETECTED
SOURCE: NORMAL

## 2021-02-27 LAB
SARS-COV-2: NOT DETECTED
SOURCE: NORMAL

## 2021-03-03 LAB
SARS-COV-2: NOT DETECTED
SOURCE: NORMAL

## 2021-03-06 LAB
SARS-COV-2: NOT DETECTED
SOURCE: NORMAL

## 2021-03-10 LAB
SARS-COV-2: NOT DETECTED
SOURCE: NORMAL

## 2021-03-13 LAB
SARS-COV-2: NOT DETECTED
SOURCE: NORMAL

## 2021-03-17 LAB
SARS-COV-2: NOT DETECTED
SOURCE: NORMAL

## 2021-03-20 LAB
SARS-COV-2: NOT DETECTED
SOURCE: NORMAL

## 2021-03-24 LAB
SARS-COV-2: NOT DETECTED
SOURCE: NORMAL

## 2021-03-27 LAB
SARS-COV-2: NOT DETECTED
SOURCE: NORMAL

## 2021-03-31 LAB
SARS-COV-2: NOT DETECTED
SOURCE: NORMAL

## 2021-04-03 LAB
SARS-COV-2: NOT DETECTED
SOURCE: NORMAL

## 2021-04-08 LAB
SARS-COV-2: NOT DETECTED
SOURCE: NORMAL

## 2021-04-10 LAB
SARS-COV-2: NOT DETECTED
SOURCE: NORMAL

## 2022-02-15 ENCOUNTER — HOSPITAL ENCOUNTER (OUTPATIENT)
Dept: GENERAL RADIOLOGY | Age: 37
Discharge: HOME | End: 2022-02-17
Payer: COMMERCIAL

## 2022-02-15 ENCOUNTER — HOSPITAL ENCOUNTER (OUTPATIENT)
Age: 37
Discharge: HOME | End: 2022-02-17
Payer: COMMERCIAL

## 2022-02-15 DIAGNOSIS — M25.561 RIGHT KNEE PAIN, UNSPECIFIED CHRONICITY: ICD-10-CM

## 2022-02-15 PROCEDURE — 73562 X-RAY EXAM OF KNEE 3: CPT

## 2023-01-20 ENCOUNTER — APPOINTMENT (OUTPATIENT)
Dept: CT IMAGING | Age: 38
End: 2023-01-20
Payer: COMMERCIAL

## 2023-01-20 ENCOUNTER — HOSPITAL ENCOUNTER (EMERGENCY)
Age: 38
Discharge: HOME | End: 2023-01-20
Attending: EMERGENCY MEDICINE
Payer: COMMERCIAL

## 2023-01-20 VITALS
TEMPERATURE: 98.1 F | OXYGEN SATURATION: 98 % | SYSTOLIC BLOOD PRESSURE: 99 MMHG | RESPIRATION RATE: 18 BRPM | HEART RATE: 97 BPM | WEIGHT: 277 LBS | HEIGHT: 65 IN | BODY MASS INDEX: 46.15 KG/M2 | DIASTOLIC BLOOD PRESSURE: 85 MMHG

## 2023-01-20 DIAGNOSIS — R10.13 ABDOMINAL PAIN, EPIGASTRIC: Primary | ICD-10-CM

## 2023-01-20 DIAGNOSIS — E86.0 MILD DEHYDRATION: ICD-10-CM

## 2023-01-20 DIAGNOSIS — D72.829 LEUKOCYTOSIS, UNSPECIFIED TYPE: ICD-10-CM

## 2023-01-20 DIAGNOSIS — R11.2 NAUSEA AND VOMITING, UNSPECIFIED VOMITING TYPE: ICD-10-CM

## 2023-01-20 LAB
ALBUMIN SERPL-MCNC: 4.5 G/DL (ref 3.5–5.2)
ALP BLD-CCNC: 95 U/L (ref 35–104)
ALT SERPL-CCNC: 22 U/L (ref 0–32)
ANION GAP SERPL CALCULATED.3IONS-SCNC: 12 MMOL/L (ref 7–16)
AST SERPL-CCNC: 19 U/L (ref 0–31)
BASOPHILS ABSOLUTE: 0.02 E9/L (ref 0–0.2)
BASOPHILS RELATIVE PERCENT: 0.2 % (ref 0–2)
BILIRUB SERPL-MCNC: 0.5 MG/DL (ref 0–1.2)
BILIRUBIN URINE: NEGATIVE
BLOOD, URINE: NEGATIVE
BUN BLDV-MCNC: 14 MG/DL (ref 6–20)
CALCIUM SERPL-MCNC: 9.9 MG/DL (ref 8.6–10.2)
CHLORIDE BLD-SCNC: 102 MMOL/L (ref 98–107)
CLARITY: CLEAR
CO2: 23 MMOL/L (ref 22–29)
COLOR: YELLOW
CREAT SERPL-MCNC: 0.7 MG/DL (ref 0.5–1)
EKG ATRIAL RATE: 106 BPM
EKG P AXIS: 42 DEGREES
EKG P-R INTERVAL: 148 MS
EKG Q-T INTERVAL: 344 MS
EKG QRS DURATION: 86 MS
EKG QTC CALCULATION (BAZETT): 456 MS
EKG R AXIS: 83 DEGREES
EKG T AXIS: 31 DEGREES
EKG VENTRICULAR RATE: 106 BPM
EOSINOPHILS ABSOLUTE: 0.11 E9/L (ref 0.05–0.5)
EOSINOPHILS RELATIVE PERCENT: 0.9 % (ref 0–6)
GFR SERPL CREATININE-BSD FRML MDRD: >60 ML/MIN/1.73
GLUCOSE BLD-MCNC: 208 MG/DL (ref 74–99)
GLUCOSE URINE: NEGATIVE MG/DL
HCG, URINE, POC: NEGATIVE
HCT VFR BLD CALC: 41.7 % (ref 34–48)
HEMOGLOBIN: 12.7 G/DL (ref 11.5–15.5)
IMMATURE GRANULOCYTES #: 0.05 E9/L
IMMATURE GRANULOCYTES %: 0.4 % (ref 0–5)
INFLUENZA A BY PCR: NOT DETECTED
INFLUENZA B BY PCR: NOT DETECTED
KETONES, URINE: 40 MG/DL
LACTIC ACID: 1.6 MMOL/L (ref 0.5–2.2)
LEUKOCYTE ESTERASE, URINE: NEGATIVE
LIPASE: 15 U/L (ref 13–60)
LYMPHOCYTES ABSOLUTE: 0.95 E9/L (ref 1.5–4)
LYMPHOCYTES RELATIVE PERCENT: 8.1 % (ref 20–42)
Lab: NORMAL
MAGNESIUM: 1.9 MG/DL (ref 1.6–2.6)
MCH RBC QN AUTO: 21.9 PG (ref 26–35)
MCHC RBC AUTO-ENTMCNC: 30.5 % (ref 32–34.5)
MCV RBC AUTO: 71.9 FL (ref 80–99.9)
METER GLUCOSE: 199 MG/DL (ref 74–99)
MONOCYTES ABSOLUTE: 0.59 E9/L (ref 0.1–0.95)
MONOCYTES RELATIVE PERCENT: 5 % (ref 2–12)
NEGATIVE QC PASS/FAIL: NORMAL
NEUTROPHILS ABSOLUTE: 10.01 E9/L (ref 1.8–7.3)
NEUTROPHILS RELATIVE PERCENT: 85.4 % (ref 43–80)
NITRITE, URINE: NEGATIVE
PDW BLD-RTO: 18.3 FL (ref 11.5–15)
PH UA: 6 (ref 5–9)
PLATELET # BLD: 386 E9/L (ref 130–450)
PMV BLD AUTO: 10.7 FL (ref 7–12)
POSITIVE QC PASS/FAIL: NORMAL
POTASSIUM SERPL-SCNC: 3.8 MMOL/L (ref 3.5–5)
PROTEIN UA: NEGATIVE MG/DL
RBC # BLD: 5.8 E12/L (ref 3.5–5.5)
SODIUM BLD-SCNC: 137 MMOL/L (ref 132–146)
SPECIFIC GRAVITY UA: >=1.03 (ref 1–1.03)
TOTAL PROTEIN: 7.8 G/DL (ref 6.4–8.3)
TROPONIN, HIGH SENSITIVITY: <6 NG/L (ref 0–9)
UROBILINOGEN, URINE: 0.2 E.U./DL
WBC # BLD: 11.7 E9/L (ref 4.5–11.5)

## 2023-01-20 PROCEDURE — 83735 ASSAY OF MAGNESIUM: CPT

## 2023-01-20 PROCEDURE — 80053 COMPREHEN METABOLIC PANEL: CPT

## 2023-01-20 PROCEDURE — 96361 HYDRATE IV INFUSION ADD-ON: CPT

## 2023-01-20 PROCEDURE — 96376 TX/PRO/DX INJ SAME DRUG ADON: CPT

## 2023-01-20 PROCEDURE — 36415 COLL VENOUS BLD VENIPUNCTURE: CPT

## 2023-01-20 PROCEDURE — 2580000003 HC RX 258: Performed by: EMERGENCY MEDICINE

## 2023-01-20 PROCEDURE — 6360000002 HC RX W HCPCS: Performed by: EMERGENCY MEDICINE

## 2023-01-20 PROCEDURE — A4216 STERILE WATER/SALINE, 10 ML: HCPCS | Performed by: EMERGENCY MEDICINE

## 2023-01-20 PROCEDURE — 83690 ASSAY OF LIPASE: CPT

## 2023-01-20 PROCEDURE — 87502 INFLUENZA DNA AMP PROBE: CPT

## 2023-01-20 PROCEDURE — 93005 ELECTROCARDIOGRAM TRACING: CPT | Performed by: NURSE PRACTITIONER

## 2023-01-20 PROCEDURE — 96374 THER/PROPH/DIAG INJ IV PUSH: CPT

## 2023-01-20 PROCEDURE — 83605 ASSAY OF LACTIC ACID: CPT

## 2023-01-20 PROCEDURE — 93010 ELECTROCARDIOGRAM REPORT: CPT | Performed by: INTERNAL MEDICINE

## 2023-01-20 PROCEDURE — 99284 EMERGENCY DEPT VISIT MOD MDM: CPT

## 2023-01-20 PROCEDURE — 82962 GLUCOSE BLOOD TEST: CPT

## 2023-01-20 PROCEDURE — 81003 URINALYSIS AUTO W/O SCOPE: CPT

## 2023-01-20 PROCEDURE — 2500000003 HC RX 250 WO HCPCS: Performed by: EMERGENCY MEDICINE

## 2023-01-20 PROCEDURE — 84484 ASSAY OF TROPONIN QUANT: CPT

## 2023-01-20 PROCEDURE — 85025 COMPLETE CBC W/AUTO DIFF WBC: CPT

## 2023-01-20 PROCEDURE — 96375 TX/PRO/DX INJ NEW DRUG ADDON: CPT

## 2023-01-20 RX ORDER — SODIUM CHLORIDE 9 MG/ML
INJECTION, SOLUTION INTRAVENOUS CONTINUOUS
Status: DISCONTINUED | OUTPATIENT
Start: 2023-01-20 | End: 2023-01-20 | Stop reason: HOSPADM

## 2023-01-20 RX ORDER — KETOROLAC TROMETHAMINE 30 MG/ML
15 INJECTION, SOLUTION INTRAMUSCULAR; INTRAVENOUS ONCE
Status: COMPLETED | OUTPATIENT
Start: 2023-01-20 | End: 2023-01-20

## 2023-01-20 RX ORDER — FAMOTIDINE 20 MG/1
20 TABLET, FILM COATED ORAL 2 TIMES DAILY
Qty: 14 TABLET | Refills: 0 | Status: SHIPPED | OUTPATIENT
Start: 2023-01-20 | End: 2023-01-27

## 2023-01-20 RX ORDER — DIPHENHYDRAMINE HYDROCHLORIDE 50 MG/ML
25 INJECTION INTRAMUSCULAR; INTRAVENOUS ONCE
Status: DISCONTINUED | OUTPATIENT
Start: 2023-01-20 | End: 2023-01-20 | Stop reason: HOSPADM

## 2023-01-20 RX ORDER — DIPHENHYDRAMINE HYDROCHLORIDE 50 MG/ML
25 INJECTION INTRAMUSCULAR; INTRAVENOUS ONCE
Status: COMPLETED | OUTPATIENT
Start: 2023-01-20 | End: 2023-01-20

## 2023-01-20 RX ORDER — SODIUM CHLORIDE 0.9 % (FLUSH) 0.9 %
10 SYRINGE (ML) INJECTION PRN
Status: DISCONTINUED | OUTPATIENT
Start: 2023-01-20 | End: 2023-01-20 | Stop reason: HOSPADM

## 2023-01-20 RX ORDER — ONDANSETRON 4 MG/1
4 TABLET, FILM COATED ORAL EVERY 8 HOURS PRN
Qty: 12 TABLET | Refills: 0 | Status: SHIPPED | OUTPATIENT
Start: 2023-01-20 | End: 2023-01-25

## 2023-01-20 RX ORDER — METOCLOPRAMIDE HYDROCHLORIDE 5 MG/ML
5 INJECTION INTRAMUSCULAR; INTRAVENOUS ONCE
Status: COMPLETED | OUTPATIENT
Start: 2023-01-20 | End: 2023-01-20

## 2023-01-20 RX ADMIN — KETOROLAC TROMETHAMINE 15 MG: 30 INJECTION, SOLUTION INTRAMUSCULAR at 02:46

## 2023-01-20 RX ADMIN — SODIUM CHLORIDE: 9 INJECTION, SOLUTION INTRAVENOUS at 02:40

## 2023-01-20 RX ADMIN — DIPHENHYDRAMINE HYDROCHLORIDE 25 MG: 50 INJECTION, SOLUTION INTRAMUSCULAR; INTRAVENOUS at 02:51

## 2023-01-20 RX ADMIN — KETOROLAC TROMETHAMINE 15 MG: 30 INJECTION, SOLUTION INTRAMUSCULAR at 04:55

## 2023-01-20 RX ADMIN — METOCLOPRAMIDE 5 MG: 5 INJECTION, SOLUTION INTRAMUSCULAR; INTRAVENOUS at 02:50

## 2023-01-20 RX ADMIN — FAMOTIDINE 20 MG: 10 INJECTION INTRAVENOUS at 02:48

## 2023-01-20 ASSESSMENT — PAIN SCALES - GENERAL
PAINLEVEL_OUTOF10: 8
PAINLEVEL_OUTOF10: 8
PAINLEVEL_OUTOF10: 4

## 2023-01-20 ASSESSMENT — PAIN DESCRIPTION - FREQUENCY: FREQUENCY: CONTINUOUS

## 2023-01-20 ASSESSMENT — PAIN - FUNCTIONAL ASSESSMENT
PAIN_FUNCTIONAL_ASSESSMENT: 0-10
PAIN_FUNCTIONAL_ASSESSMENT: NONE - DENIES PAIN

## 2023-01-20 ASSESSMENT — PAIN DESCRIPTION - PAIN TYPE: TYPE: ACUTE PAIN

## 2023-01-20 ASSESSMENT — PAIN DESCRIPTION - LOCATION
LOCATION: ABDOMEN
LOCATION: ABDOMEN

## 2023-01-20 ASSESSMENT — PAIN DESCRIPTION - ORIENTATION: ORIENTATION: RIGHT;UPPER

## 2023-01-20 NOTE — ED PROVIDER NOTES
[address]        Pt Name: Jari Bamberger  MRN: [id number]  [place] 1985  Date of evaluation: 2023  Provider: Muriel Medina DO  PCP: Gil Mota DO  Note Started: 2:08 AM EST 23    CHIEF COMPLAINT       Chief Complaint   Patient presents with    Chest Pain     Started 1 hour ago    Abdominal Pain     Started at 5pm and getting worse    Back Pain     Started at [facility]: 1 or more Elements   History From: Patient, PMR     Limitations to history : None    Jari Bamberger is a 44 y.o. female who presents chest discomfort abdominal pain nausea vomiting back pain. She reports this evening she did not feel well, has vomited several times nonbloody nonbilious. Complains of pain epigastric and right upper quadrant. Also feeling pain in that area across her back. Had Zofran at home to take that prior to arrival did not improve her nausea. Denies fevers chills diarrhea, surgical history significant for previous exploratory laparoscopic for endometriosis. Not recall eating anything that triggered this. Recently diagnosed diabetic, was on metformin for 1 month transition to [facility] secondary to GI upset. Nursing Notes were all reviewed and agreed with or any disagreements were addressed in the HPI. REVIEW OF SYSTEMS :           Positives and Pertinent negatives as per HPI.      SURGICAL HISTORY     Past Surgical History:   Procedure Laterality Date     SECTION  2017    KNEE SURGERY      LAPAROSCOPY  2010    TONSILLECTOMY      WISDOM TOOTH EXTRACTION         CURRENTMEDICATIONS       Previous Medications    ALBUTEROL SULFATE HFA (VENTOLIN HFA) 108 (90 BASE) MCG/ACT INHALER    Inhale 2 puffs into the lungs 4 times daily as needed for Wheezing    IBUPROFEN (ADVIL;MOTRIN) 400 MG TABLET    Take 400 mg by mouth every 6 hours as needed for Pain       ALLERGIES     Lorazepam, Pcn [penicillins], and Sulfa antibiotics    FAMILYHISTORY       Family History   Problem Relation Age of Onset    Heart Disease Father     Heart Disease Mother     Other Sister         RESPIRATORY DISEASE    Thyroid Disease Sister     Hypertension Other     Diabetes Brother         SOCIAL HISTORY       Social History     Tobacco Use    Smoking status: Never    Smokeless tobacco: Never   Substance Use Topics    Alcohol use: No    Drug use: No       SCREENINGS        Daina Coma Scale  Eye Opening: Spontaneous  Best Verbal Response: Oriented  Best Motor Response: Obeys commands  Daina Coma Scale Score: 15                CIWA Assessment  BP: 99/85  Heart Rate: 97           PHYSICAL EXAM  1 or more Elements     ED Triage Vitals [01/20/23 0131]   BP Temp Temp Source Heart Rate Resp SpO2 Height Weight   (!) 162/103 98.1 °F (36.7 °C) Oral 95 18 100 % 5' 5\" (1.651 m) 277 lb (125.6 kg)                 Constitutional/General: Alert and oriented x3 distress due to pain and nausea, vomiting on exam  Head: Normocephalic and atraumatic  Eyes: PERRL, EOMI, conjunctiva normal, sclera non icteric  ENT:  Oropharynx clear, handling secretions, no trismus, no asymmetry of the posterior oropharynx or uvular edema  Neck: Supple, full ROM, no stridor, no meningeal signs  Respiratory: Lungs clear to auscultation bilaterally,   Not in respiratory distress  Cardiovascular: Tachycardic and regular 2+ distal pulses. Equal extremity pulses. Chest: No chest wall tenderness  GI:  Abdomen Soft, no CVA tenderness, BMI of 46, tenderness in the epigastric right upper quadrant. Musculoskeletal: Moves all extremities x 4. Warm and well perfused,  . Capillary refill <3 seconds  Integument: skin warm and dry. No rashes.    Neurologic: GCS 15, no focal deficits,    Psychiatric: Normal Affect            DIAGNOSTIC RESULTS   LABS:    Labs Reviewed   CBC WITH AUTO DIFFERENTIAL - Abnormal; Notable for the following components:       Result Value    WBC 11.7 (*)     RBC 5.80 (*)     MCV 71.9 (*)     MCH 21.9 (*)     MCHC 30.5 (*)     RDW 18.3 (*)     Neutrophils % 85.4 (*)     Lymphocytes % 8.1 (*)     Neutrophils Absolute 10.01 (*)     Lymphocytes Absolute 0.95 (*)     All other components within normal limits   COMPREHENSIVE METABOLIC PANEL - Abnormal; Notable for the following components:    Glucose 208 (*)     All other components within normal limits   URINALYSIS - Abnormal; Notable for the following components:    Ketones, Urine 40 (*)     All other components within normal limits   POCT GLUCOSE - Abnormal; Notable for the following components:    Meter Glucose 199 (*)     All other components within normal limits   RAPID INFLUENZA A/B ANTIGENS   TROPONIN   MAGNESIUM   LACTIC ACID   LIPASE   POC PREGNANCY UR-QUAL       As interpreted by me, the above displayed labs are abnormal. All other labs obtained during this visit were within normal range or not returned as of this dictation. EKG Interpretation  Interpreted by emergency department physician, Muriel Medina, DO      EKG @ 0025: This EKG is signed and interpreted by Dr Fabiano Tsai. Rate: 106  Rhythm: Sinus  Interpretation: Binh rhythm with sinus tachycardia, TX is 148, QRS is 86, QTc is 456, no evidence of ST elevation, nonspecific and stable compared to 3/5/2020  Comparison: stable as compared to patient's most recent EKG          RADIOLOGY:   Non-plain film images such as CT, Ultrasound and MRI are read by the radiologist. Plain radiographic images are visualized and preliminarily interpreted by the ED Provider with the below findings:    Patient Declined CXR and CT abd     Interpretation per the Radiologist below, if available at the time of this note:    XR CHEST PORTABLE    (Results Pending)     No results found. No results found.     PROCEDURES   Unless otherwise noted below, none          CRITICAL CARE TIME (.cct)        PAST MEDICAL HISTORY/Chronic Conditions Affecting Care      has a past medical history of Benign essential hypertension antepartum, GDM (gestational diabetes mellitus) (1/26/2012), gestational diabetes, and Obesity. EMERGENCY DEPARTMENT COURSE    Vitals:    Vitals:    01/20/23 0131 01/20/23 0259 01/20/23 0429   BP: (!) 162/103 (!) 153/97 99/85   Pulse: 95 (!) 106 97   Resp: 18 18 18   Temp: 98.1 °F (36.7 °C)     TempSrc: Oral     SpO2: 100% 100% 98%   Weight: 277 lb (125.6 kg)     Height: 5' 5\" (1.651 m)         Patient was given the following medications:  Medications   0.9 % sodium chloride infusion ( IntraVENous Stopped 1/20/23 0455)   iopamidol (ISOVUE-370) 76 % injection 75 mL (has no administration in time range)   sodium chloride flush 0.9 % injection 10 mL (has no administration in time range)   diphenhydrAMINE (BENADRYL) injection 25 mg (25 mg IntraVENous Not Given 1/20/23 0545)   famotidine (PEPCID) 20 mg in sodium chloride (PF) 0.9 % 10 mL injection (20 mg IntraVENous Given 1/20/23 0248)   diphenhydrAMINE (BENADRYL) injection 25 mg (25 mg IntraVENous Given 1/20/23 0251)   metoclopramide (REGLAN) injection 5 mg (5 mg IntraVENous Given 1/20/23 0250)   ketorolac (TORADOL) injection 15 mg (15 mg IntraVENous Given 1/20/23 0246)   ketorolac (TORADOL) injection 15 mg (15 mg IntraVENous Given 1/20/23 0455)           Is this patient to be included in the SEP-1 Core Measure due to severe sepsis or septic shock? No   Exclusion criteria - the patient is NOT to be included for SEP-1 Core Measure due to:   Infection is not suspected        Medical Decision Making/Differential Diagnosis:    CC/HPI Summary, Social Determinants of health, Records Reviewed, DDx, testing done/not done, ED Course, Reassessment, disposition considerations/shared decision making with patient, consults, disposition:      ED Course as of 01/20/23 0618   Fri Jan 20, 2023 0427 Comprehensive Metabolic Panel(!):    Sodium 137   Potassium 3.8   Chloride 102   CO2 23   Anion Gap 12   Glucose, Random 208(!)   BUN,BUNPL 14   Creatinine 0.7   Est, Glom Filt Rate >60   CALCIUM, SERUM, 734210 9.9   Total Protein 7.8   Albumin 4.5   Bilirubin 0.5   Alk Phos 95   ALT 22   AST 19  No high anion gap metabolic acidosis, no electrolyte disturbances [ME]   0427 Lipase:    Lipase 15  Normal limits [ME]   0427 CBC with Auto Differential(!):    WBC 11.7(!)   RBC 5.80(!)   Hemoglobin Quant 12.7   Hematocrit 41.7   MCV 71.9(!)   MCH 21.9(!)   MCHC 30.5(!)   RDW 18.3(!)   Platelet Count 167   MPV 10.7   Neutrophils % 85.4(!)   Immature Granulocytes % 0.4   Lymphocyte % 8.1(!)   Monocytes % 5.0   Eosinophils % 0.9   Basophils % 0.2   Neutrophils Absolute 10.01(!)   Immature Granulocytes # 0.05   Lymphocytes Absolute 0.95(!)   Monocytes Absolute 0.59   Eosinophils Absolute 0.11   Basophils Absolute 0.02  Mild leukocytosis, neutrophil count of 85  [ME]   0427 Lactic Acid:    Lactic Acid 1.6  No lactic acidosis [ME]   0545 CT was ordered secondary to leukocytosis and abdominal pain, patient became anxious when going to CT scan, she has allergy to benzodiazepines, ordered Benadryl have her states she will not have a CT scan [ME]      ED Course User Index  [ME] Shi Campos, DO        Medical Decision Making  Patient presents for nausea vomiting upper abdominal pain. Differential diagnosis includes pancreatitis, cholecystitis, cystitis, influenza viral gastroenteritis, to name a few. Work-up undertaken, she had significant improvement after IV Zofran and IV fluids and IV Toradol. CBC showed a leukocytosis, CT of her abdomen was ordered however she became very anxious and CT scan, declined medicated for this and is declining a CT scan. Also declines chest x-ray. Discussed supportive care as well as strict return precautions. She states understanding agreement    Amount and/or Complexity of Data Reviewed  Labs: ordered.  Decision-making details documented in ED Course. Radiology: ordered. ECG/medicine tests: ordered and independent interpretation performed. Decision-making details documented in ED Course. Risk  Prescription drug management. CONSULTS: (Who and What was discussed)  None         I am the Primary Clinician of Record. FINAL IMPRESSION      1. Abdominal pain, epigastric    2. Nausea and vomiting, unspecified vomiting type    3. Leukocytosis, unspecified type    4.  Mild dehydration          DISPOSITION/PLAN     DISPOSITION Decision To Discharge 01/20/2023 06:14:47 AM      PATIENT REFERRED TO:  Trang Au DO  [address] [facility]  [phone]    Schedule an appointment as soon as possible for a visit       Follow up with your doctor or call the [facility] for a family doctor  Call [phone]  Call       DISCHARGE MEDICATIONS:  New Prescriptions    FAMOTIDINE (PEPCID) 20 MG TABLET    Take 1 tablet by mouth 2 times daily for 7 days    ONDANSETRON (ZOFRAN) 4 MG TABLET    Take 1 tablet by mouth every 8 hours as needed for Nausea or Vomiting May substitute for covered product       DISCONTINUED MEDICATIONS:  Discontinued Medications    No medications on file              (Please note that portions of this note were completed with a voice recognition program.  Efforts were made to edit the dictations but occasionally words are mis-transcribed.)    Javier Ross DO (electronically signed)           Javier Ross DO  01/20/23 2217

## 2023-01-20 NOTE — Clinical Note
Elis Whitaker was seen and treated in our emergency department on 1/20/2023. She may return to work on 01/21/2023. If you have any questions or concerns, please don't hesitate to call.       Farooq Buitrago, DO